# Patient Record
Sex: FEMALE | Race: WHITE | NOT HISPANIC OR LATINO | Employment: FULL TIME | URBAN - METROPOLITAN AREA
[De-identification: names, ages, dates, MRNs, and addresses within clinical notes are randomized per-mention and may not be internally consistent; named-entity substitution may affect disease eponyms.]

---

## 2020-03-22 ENCOUNTER — APPOINTMENT (OUTPATIENT)
Dept: RADIOLOGY | Facility: IMAGING CENTER | Age: 60
End: 2020-03-22
Attending: NURSE PRACTITIONER
Payer: OTHER MISCELLANEOUS

## 2020-03-22 ENCOUNTER — OCCUPATIONAL MEDICINE (OUTPATIENT)
Dept: URGENT CARE | Facility: PHYSICIAN GROUP | Age: 60
End: 2020-03-22
Payer: OTHER MISCELLANEOUS

## 2020-03-22 VITALS
HEART RATE: 72 BPM | RESPIRATION RATE: 16 BRPM | WEIGHT: 250 LBS | SYSTOLIC BLOOD PRESSURE: 138 MMHG | TEMPERATURE: 98.3 F | OXYGEN SATURATION: 95 % | BODY MASS INDEX: 39.24 KG/M2 | HEIGHT: 67 IN | DIASTOLIC BLOOD PRESSURE: 84 MMHG

## 2020-03-22 DIAGNOSIS — S61.208A OPEN WOUND OF LITTLE FINGER: ICD-10-CM

## 2020-03-22 DIAGNOSIS — Z23 NEED FOR TETANUS BOOSTER: ICD-10-CM

## 2020-03-22 DIAGNOSIS — S67.10XA CRUSH INJURY TO FINGER, INITIAL ENCOUNTER: ICD-10-CM

## 2020-03-22 PROCEDURE — 90471 IMMUNIZATION ADMIN: CPT | Mod: 29 | Performed by: NURSE PRACTITIONER

## 2020-03-22 PROCEDURE — 12001 RPR S/N/AX/GEN/TRNK 2.5CM/<: CPT | Mod: 29,F9 | Performed by: NURSE PRACTITIONER

## 2020-03-22 PROCEDURE — 73140 X-RAY EXAM OF FINGER(S): CPT | Mod: TC,RT | Performed by: NURSE PRACTITIONER

## 2020-03-22 PROCEDURE — 90715 TDAP VACCINE 7 YRS/> IM: CPT | Mod: 29 | Performed by: NURSE PRACTITIONER

## 2020-03-22 PROCEDURE — 99203 OFFICE O/P NEW LOW 30 MIN: CPT | Mod: 29,25 | Performed by: NURSE PRACTITIONER

## 2020-03-22 ASSESSMENT — ENCOUNTER SYMPTOMS
FEVER: 0
CHILLS: 0
BRUISES/BLEEDS EASILY: 0
TINGLING: 1
MYALGIAS: 1
SENSORY CHANGE: 1

## 2020-03-22 NOTE — LETTER
[facility]  [address] - [place] [address]  Phone:  [phone] - Fax:  [phone]   [facility] Progress Report and Disability Certification  Date of Service: 3/22/2020   No Show:  No  Date / Time of Next Visit: 3/29/2020 @ 1:00 PM    Claim Information   Patient Name: Daniela Dee  Claim Number:     Employer: [employer]  Date of Injury: 3/22/2020     Insurer / TPA:    ID / SSN:     Occupation:   Diagnosis: Diagnoses of Crush injury to finger, initial encounter, Open wound of little finger, and Need for tetanus booster were pertinent to this visit.    Medical Information   Related to Industrial Injury? Yes    Subjective Complaints:  DOI: 3/22/20. 59 year old female with crush injury to right 5th digit after getting caught between a  and a box at work this morning. She has open wound as well. She has mild pain and bleeding as well as some distal paresthesia. No weakness. She is left hand dominant. Unsure of last tetanus dosing. She has not done any treatment. No second job.   Objective Findings: Physical Exam  Constitutional:       Appearance: Normal appearance. She is not ill-appearing.   Musculoskeletal: Normal range of motion.      Right hand: She exhibits tenderness, bony tenderness, laceration and swelling. She exhibits normal range of motion.        Hands:    Skin:     General: Skin is warm and dry.      Comments: 1.5 cm open wound to palmar side of 5th digit right hand.   Neurological:      General: No focal deficit present.      Mental Status: She is alert.   Psychiatric:         Mood and Affect: Mood normal.         Behavior: Behavior normal.        Pre-Existing Condition(s):     Assessment:   Initial Visit    Status: Additional Care Required  Permanent Disability:No    Plan:      Diagnostics: X-ray    Comments:  Negative imaging    Disability Information   Status: Released to Full Duty    From:   3/22/2020  Through: 3/29/2020 Restrictions are: Temporary   Physical Restrictions   Sitting:    Standing:    Stooping:    Bending:      Squatting:    Walking:    Climbing:    Pushing:      Pulling:    Other:    Reaching Above Shoulder (L):   Reaching Above Shoulder (R):       Reaching Below Shoulder (L):    Reaching Below Shoulder (R):      Not to exceed Weight Limits   Carrying(hrs):   Weight Limit(lb):   Lifting(hrs):   Weight  Limit(lb):     Comments:      Repetitive Actions   Hands: i.e. Fine Manipulations from Grasping:     Feet: i.e. Operating Foot Controls:     Driving / Operate Machinery:     Physician Name: RADHA Scanlon Physician Signature: MADELINE Armijo e-Signature: Dr. Gurpreet Romero, Medical Director   Clinic Name / Location: [address]  [place] [address] Clinic Phone Number: Dept: [phone]   Appointment Time: 11:30 Am Visit Start Time: 11:43 AM   Check-In Time:  11:41 Am Visit Discharge Time:  12:21PM    Original-Treating Physician or Chiropractor    Page 2-Insurer/TPA    Page 3-Employer    Page 4-Employee

## 2020-03-22 NOTE — LETTER
[facility]  [address] - [place] [address]  Phone:  [phone] - Fax:  [phone]   [facility] Progress Report and Disability Certification  Date of Service: 3/22/2020   No Show:  No  Date / Time of Next Visit: 3/29/2020   Claim Information   Patient Name: Daniela Dee  Claim Number:     Employer: [employer]  Date of Injury: 3/22/2020     Insurer / TPA:    ID / SSN:     Occupation:     Diagnosis: Diagnoses of Crush injury to finger, initial encounter, Open wound of little finger, and Need for tetanus booster were pertinent to this visit.    Medical Information   Related to Industrial Injury? Yes    Subjective Complaints:  DOI: 3/22/20. 59 year old female with crush injury to right 5th digit after getting caught between a  and a box at work this morning. She has open wound as well. She has mild pain and bleeding as well as some distal paresthesia. No weakness. She is left hand dominant. Unsure of last tetanus dosing. She has not done any treatment. No second job.   Objective Findings: Physical Exam  Constitutional:       Appearance: Normal appearance. She is not ill-appearing.   Musculoskeletal: Normal range of motion.      Right hand: She exhibits tenderness, bony tenderness, laceration and swelling. She exhibits normal range of motion.        Hands:    Skin:     General: Skin is warm and dry.      Comments: 1.5 cm open wound to palmar side of 5th digit right hand.   Neurological:      General: No focal deficit present.      Mental Status: She is alert.   Psychiatric:         Mood and Affect: Mood normal.         Behavior: Behavior normal.        Pre-Existing Condition(s):     Assessment:   Initial Visit    Status: Additional Care Required  Permanent Disability:No    Plan:      Diagnostics: X-ray    Comments:  Negative imaging    Disability Information   Status: Released to Full Duty    From:   3/22/2020  Through: 3/29/2020 Restrictions are: Temporary   Physical Restrictions   Sitting:    Standing:    Stooping:    Bending:      Squatting:    Walking:    Climbing:    Pushing:      Pulling:    Other:    Reaching Above Shoulder (L):   Reaching Above Shoulder (R):       Reaching Below Shoulder (L):    Reaching Below Shoulder (R):      Not to exceed Weight Limits   Carrying(hrs):   Weight Limit(lb):   Lifting(hrs):   Weight  Limit(lb):     Comments:      Repetitive Actions   Hands: i.e. Fine Manipulations from Grasping:     Feet: i.e. Operating Foot Controls:     Driving / Operate Machinery:     Physician Name: RADHA Scanlon Physician Signature: MADELINE Armijo e-Signature: Dr. Gurpreet Romero, Medical Director   Clinic Name / Location: [address]  [place] [address] Clinic Phone Number: Dept: [phone]   Appointment Time: 11:30 Am Visit Start Time: 11:43 AM   Check-In Time:  11:41 Am Visit Discharge Time: 12:31 PM    Original-Treating Physician or Chiropractor    Page 2-Insurer/TPA    Page 3-Employer    Page 4-Employee

## 2020-03-22 NOTE — LETTER
"EMPLOYEE’S CLAIM FOR COMPENSATION/ REPORT OF INITIAL TREATMENT  FORM C-4    EMPLOYEE’S CLAIM - PROVIDE ALL INFORMATION REQUESTED   First Name  Daniela Last Name  Luiz Birthdate                    1960                Sex  female Claim Number   Home Address  [address] Age  59 y.o. Height  1.689 m (5' 6.5\") Weight  113.4 kg (250 lb) [facility]     [facility] [facility] Zip  [address] Telephone  [phone] (home) [phone] (work)   Mailing Address  [address] [facility] [place] [facility] Zip  [address] Primary Language Spoken  English    Insurer   Third Party       Employee's Occupation (Job Title) When Injury or Occupational Disease Occurred      Employer's Name   [employer]  Telephone      Employer Address  [address]  Zip  [address]    Date of Injury  3/22/2020               Hour of Injury  10:20 AM Date Employer Notified  3/22/2020 Last Day of Work after Injury or Occupational Disease  3/22/2020 Supervisor to Whom Injury Reported  Gianna Laureano    Address or Location of Accident (if applicable)  [[address] ]   What were you doing at the time of accident? (if applicable)  Tieing a bale of cardboard     How did this injury or occupational disease occur? (Be specific an answer in detail. Use additional sheet if necessary)  Bale was stuck to put my hand out to push it, it came back and smack my pinky finger in right hand to the bale   If you believe that you have an occupational disease, when did you first have knowledge of the disability and it relationship to your employment?  n/a Witnesses to the Accident  n/a      Nature of Injury or Occupational Disease  Laceration  Part(s) of Body Injured or Affected  Finger (R), ,     I certify that the above is true and correct to the best of my knowledge and that I have provided this information in order to " obtain the benefits of [place]  I hereby authorize any physician, chiropractor, surgeon, practitioner, or other person, any hospital, including [facility] or [facility] hospital, any medical service organization, any insurance company, or other institution or organization to release to each other, any medical or other information, including benefits paid or payable, pertinent to this injury or disease, except information relative to diagnosis, treatment and/or counseling for AIDS, psychological conditions, alcohol or controlled substances, for which I must give specific authorization.  A Photostat of this authorization shall be as valid as the original.     Date   Place   Employee’s Signature   THIS REPORT MUST BE COMPLETED AND MAILED WITHIN 3 WORKING DAYS OF TREATMENT   Place  [facility]  Name of Facility  [place]   Date  3/22/2020 Diagnosis  (S67.10XA) Crush injury to finger, initial encounter  (S61.208A) Open wound of little finger  (Z23) Need for tetanus booster Is there evidence the injured employee was under the influence of alcohol and/or another controlled substance at the time of accident?   Hour  11:43 AM Description of Injury or Disease  Diagnoses of Crush injury to finger, initial encounter, Open wound of little finger, and Need for tetanus booster were pertinent to this visit.     Treatment    Procedure: Laceration Repair  -Risks including bleeding, nerve damage, infection, and poor cosmetic outcome discussed. Benefits and alternatives discussed.   -Clean technique with sterile instruments and suture used  -Local anesthesia with 2% lidocaine  -Closed with2  #  4-0 Nylon interrupted sutures with good wound approximation  -Polysporin and dressing placed  -Patient tolerated well  tdap updated      Have you advised the patient to remain off work five days or more? No  "  X-Ray Findings  Negative   If Yes   From Date  To Date      From information given by the employee, together with medical evidence, can you directly connect this injury or occupational disease as job incurred?  Yes If No Full Duty Yes Modified Duty  No   Is additional medical care by a physician indicated?  Yes If Modified Duty, Specify any Limitations / Restrictions      Do you know of any previous injury or disease contributing to this condition or occupational disease?                            No   Date  3/22/2020 Print Doctor’s Name RADHA Scanlon I certify the employer’s copy of  this form was mailed on:   Address  [address] [place] [address]. [address] Insurer’s Use Only     [facility] Zip  [address]    Provider’s Tax ID Number  [id number] Telephone  Dept: [phone]        e-MADELINE Jack   e-Signature: Dr. Gurpreet Romero,   Medical Director Degree  MD        ORIGINAL-TREATING PHYSICIAN OR CHIROPRACTOR    PAGE 2-INSURER/TPA    PAGE 3-EMPLOYER    PAGE 4-EMPLOYEE             Form C-4 (rev.10/07)              BRIEF DESCRIPTION OF RIGHTS AND BENEFITS  (Pursuant to [place] 616[place])    Notice of Injury or Occupational Disease (Incident Report Form C-1): If an injury or occupational disease (OD) arises out of and in the course of employment, you must provide written notice to your employer as soon as practicable, but no later than 7 days after the accident or OD. Your employer shall maintain a sufficient supply of the required forms.    Claim for Compensation (Form C-4): If medical treatment is sought, the form C-4 is available at the place of initial treatment. A completed \"Claim for Compensation\" (Form C-4) must be filed within 90 days after an accident or OD. The treating physician or chiropractor must, within 3 working days after treatment, complete and mail to the employer, the employer's insurer and third-party , the Claim for Compensation.    Medical Treatment: If you " require medical treatment for your on-the-job injury or OD, you may be required to select a physician or chiropractor from a list provided by your workers’ compensation insurer, if it has contracted with an Organization for Managed Care (MCO) or Preferred Provider Organization (PPO) or providers of health care. If your employer has not entered into a contract with an MCO or PPO, you may select a physician or chiropractor from the Panel of Physicians and Chiropractors. Any medical costs related to your industrial injury or OD will be paid by your insurer.    Temporary Total Disability (TTD): If your doctor has certified that you are unable to work for a period of at least 5 consecutive days, or 5 cumulative days in a 20-day period, or places restrictions on you that your employer does not accommodate, you may be entitled to TTD compensation.    Temporary Partial Disability (TPD): If the wage you receive upon reemployment is less than the compensation for TTD to which you are entitled, the insurer may be required to pay you TPD compensation to make up the difference. TPD can only be paid for a maximum of 24 months.    Permanent Partial Disability (PPD): When your medical condition is stable and there is an indication of a PPD as a result of your injury or OD, within 30 days, your insurer must arrange for an evaluation by a rating physician or chiropractor to determine the degree of your PPD. The amount of your PPD award depends on the date of injury, the results of the PPD evaluation and your age and wage.    Permanent Total Disability (PTD): If you are medically certified by a treating physician or chiropractor as permanently and totally disabled and have been granted a PTD status by your insurer, you are entitled to receive monthly benefits not to exceed 66 2/3% of your average monthly wage. The amount of your PTD payments is subject to reduction if you previously received a PPD award.    Vocational Rehabilitation  Services: You may be eligible for vocational rehabilitation services if you are unable to return to the job due to a permanent physical impairment or permanent restrictions as a result of your injury or occupational disease.    Transportation and Per Silvia Reimbursement: You may be eligible for travel expenses and per silvia associated with medical treatment.    Reopening: You may be able to reopen your claim if your condition worsens after claim closure.    Appeal Process: If you disagree with a written determination issued by the insurer or the insurer does not respond to your request, you may appeal to the Department of Administration, , by following the instructions contained in your determination letter. You must appeal the determination within 70 days from the date of the determination letter at [address], [address], [place], [place] [address], or [address], [place], [place] [address]. If you disagree with the  decision, you may appeal to the Department of Administration, . You must file your appeal within 30 days from the date of the  decision letter at [address], [address], [place], [place] [address], or [address], [place], [place] [address]. If you disagree with a decision of an , you may file a petition for judicial review with the District Court. You must do so within 30 days of the Appeal Officer’s decision. You may be represented by an  at your own expense or you may contact the [facility] for possible representation.    [place]  for Injured Workers (NAIW): If you disagree with a  decision, you may request that NAIW represent you without charge at an  Hearing. For information regarding denial of benefits, you may contact the [facility] at: [address], [address], [place] [address], [phone], or [address],  [address], [place] [address], [phone]    To File a Complaint with the Division: If you wish to file a complaint with the  of the Division of Industrial Relations (DIR),  please contact the Workers’ Compensation Section, [address], [address], [place], [place] [address], telephone [phone]) [phone], or [address], [address], [place], [place] [address], telephone [phone].    For assistance with Workers’ Compensation Issues: You may contact the Office of the Governor Consumer Health Assistance, [address] [place] [address], [address], [place], [place] [address], Toll Free 1-638.997.4609, Web site: http://govcha.[facility].[place].us, E-mail [email or web address].                   __________________________________________________________________                                                     _________        Employee Name / Signature                                                                                                                                              Date                                                                                                                                                                                                     D-2 (rev. 06/18)

## 2020-03-22 NOTE — LETTER
"EMPLOYEE’S CLAIM FOR COMPENSATION/ REPORT OF INITIAL TREATMENT  FORM C-4    EMPLOYEE’S CLAIM - PROVIDE ALL INFORMATION REQUESTED   First Name  Daniela Last Name  Luiz Birthdate                    1960                Sex  female Claim Number   Home Address  [address] Age  59 y.o. Height  1.689 m (5' 6.5\") Weight  113.4 kg (250 lb) [employer] [facility]     [facility] Zip  [address] Telephone  [phone] (home) [phone] (work)   Mailing Address  [address] [facility] Zip  [address] Primary Language Spoken  English    Insurer   Third Party       Employee's Occupation (Job Title) When Injury or Occupational Disease Occurred      Employer's Name  [employer]  Telephone  [phone]    Employer Address  [address]  Zip  [address]   Date of Injury  3/22/2020               Hour of Injury  10:20 AM Date Employer Notified  3/22/2020 Last Day of Work after Injury or Occupational Disease  3/22/2020 Supervisor to Whom Injury Reported  Gianna Laureano    Address or Location of Accident (if applicable)  [[address] ]   What were you doing at the time of accident? (if applicable)  Tieing a bale of cardboard     How did this injury or occupational disease occur? (Be specific an answer in detail. Use additional sheet if necessary)  Bale was stuck to put my hand out to push it, it came back and smack my pinky finger in right hand to the bale   If you believe that you have an occupational disease, when did you first have knowledge of the disability and it relationship to your employment?  n/a Witnesses to the Accident  n/a      Nature of Injury or Occupational Disease  Laceration  Part(s) of Body Injured or Affected  Finger (R), ,     I certify that the above is true and correct to the best of my knowledge and that I have provided this information in order " to obtain the benefits of [place]  I hereby authorize any physician, chiropractor, surgeon, practitioner, or other person, any hospital, including [facility] or [facility], any medical service organization, any insurance company, or other institution or organization to release to each other, any medical or other information, including benefits paid or payable, pertinent to this injury or disease, except information relative to diagnosis, treatment and/or counseling for AIDS, psychological conditions, alcohol or controlled substances, for which I must give specific authorization.  A Photostat of this authorization shall be as valid as the original.     Date   Place   Employee’s Signature   THIS REPORT MUST BE COMPLETED AND MAILED WITHIN 3 WORKING DAYS OF TREATMENT   Place  [facility]  Name of Facility  [place]   Date  3/22/2020 Diagnosis  (S67.10XA) Crush injury to finger, initial encounter  (S61.208A) Open wound of little finger  (Z23) Need for tetanus booster Is there evidence the injured employee was under the influence of alcohol and/or another controlled substance at the time of accident?   Hour  11:43 AM Description of Injury or Disease  Diagnoses of Crush injury to finger, initial encounter, Open wound of little finger, and Need for tetanus booster were pertinent to this visit.     Treatment    Procedure: Laceration Repair  -Risks including bleeding, nerve damage, infection, and poor cosmetic outcome discussed. Benefits and alternatives discussed.   -Clean technique with sterile instruments and suture used  -Local anesthesia with 2% lidocaine  -Closed with2  #  4-0 Nylon interrupted sutures with good wound approximation  -Polysporin and dressing placed  -Patient tolerated well  tdap updated      Have you advised the patient to remain off work five days or more?  "No   X-Ray Findings  Negative   If Yes   From Date  To Date      From information given by the employee, together with medical evidence, can you directly connect this injury or occupational disease as job incurred?  Yes If No Full Duty Yes Modified Duty  No   Is additional medical care by a physician indicated?  Yes If Modified Duty, Specify any Limitations / Restrictions      Do you know of any previous injury or disease contributing to this condition or occupational disease?                            No   Date  3/22/2020 Print Doctor’s Name RADHA Scanlon I certify the employer’s copy of  this form was mailed on:   Address  [address] [place] [address]. [address] Insurer’s Use Only     [facility] Zip  [address]    Provider’s Tax ID Number  [id number] Telephone  Dept: [phone]        e-MADELINE Jack   e-Signature: Dr. Gurpreet Romero,   Medical Director Degree  MD        ORIGINAL-TREATING PHYSICIAN OR CHIROPRACTOR    PAGE 2-INSURER/TPA    PAGE 3-EMPLOYER    PAGE 4-EMPLOYEE             Form C-4 (rev.10/07)              BRIEF DESCRIPTION OF RIGHTS AND BENEFITS  (Pursuant to [place] 616C.[place])    Notice of Injury or Occupational Disease (Incident Report Form C-1): If an injury or occupational disease (OD) arises out of and in the course of employment, you must provide written notice to your employer as soon as practicable, but no later than 7 days after the accident or OD. Your employer shall maintain a sufficient supply of the required forms.    Claim for Compensation (Form C-4): If medical treatment is sought, the form C-4 is available at the place of initial treatment. A completed \"Claim for Compensation\" (Form C-4) must be filed within 90 days after an accident or OD. The treating physician or chiropractor must, within 3 working days after treatment, complete and mail to the employer, the employer's insurer and third-party , the Claim for Compensation.    Medical Treatment: If you " require medical treatment for your on-the-job injury or OD, you may be required to select a physician or chiropractor from a list provided by your workers’ compensation insurer, if it has contracted with an Organization for Managed Care (MCO) or Preferred Provider Organization (PPO) or providers of health care. If your employer has not entered into a contract with an MCO or PPO, you may select a physician or chiropractor from the Panel of Physicians and Chiropractors. Any medical costs related to your industrial injury or OD will be paid by your insurer.    Temporary Total Disability (TTD): If your doctor has certified that you are unable to work for a period of at least 5 consecutive days, or 5 cumulative days in a 20-day period, or places restrictions on you that your employer does not accommodate, you may be entitled to TTD compensation.    Temporary Partial Disability (TPD): If the wage you receive upon reemployment is less than the compensation for TTD to which you are entitled, the insurer may be required to pay you TPD compensation to make up the difference. TPD can only be paid for a maximum of 24 months.    Permanent Partial Disability (PPD): When your medical condition is stable and there is an indication of a PPD as a result of your injury or OD, within 30 days, your insurer must arrange for an evaluation by a rating physician or chiropractor to determine the degree of your PPD. The amount of your PPD award depends on the date of injury, the results of the PPD evaluation and your age and wage.    Permanent Total Disability (PTD): If you are medically certified by a treating physician or chiropractor as permanently and totally disabled and have been granted a PTD status by your insurer, you are entitled to receive monthly benefits not to exceed 66 2/3% of your average monthly wage. The amount of your PTD payments is subject to reduction if you previously received a PPD award.    Vocational Rehabilitation  Services: You may be eligible for vocational rehabilitation services if you are unable to return to the job due to a permanent physical impairment or permanent restrictions as a result of your injury or occupational disease.    Transportation and Per Silvia Reimbursement: You may be eligible for travel expenses and per silvia associated with medical treatment.    Reopening: You may be able to reopen your claim if your condition worsens after claim closure.    Appeal Process: If you disagree with a written determination issued by the insurer or the insurer does not respond to your request, you may appeal to the Department of Administration, , by following the instructions contained in your determination letter. You must appeal the determination within 70 days from the date of the determination letter at [address], [address], [place], [place] [address], or [address], [place], [place] [address]. If you disagree with the  decision, you may appeal to the Department of Administration, . You must file your appeal within 30 days from the date of the  decision letter at [address], [address], [place], [place] [address], or [address], [address], [place], [place] [address]. If you disagree with a decision of an , you may file a petition for judicial review with the District Court. You must do so within 30 days of the Appeal Officer’s decision. You may be represented by an  at your own expense or you may contact the [facility] for possible representation.    [place]  for Injured Workers (NAIW): If you disagree with a  decision, you may request that NAIW represent you without charge at an  Hearing. For information regarding denial of benefits, you may contact the [facility] at: [address], [address], [place] [address], [phone], or [address],  [address], [place] [address], [phone]    To File a Complaint with the Division: If you wish to file a complaint with the  of the Division of Industrial Relations (DIR),  please contact the Workers’ Compensation Section, [address], [address], [place], [place] [address], telephone [phone], or [address], [address], [place], [place] [address], telephone [phone].    For assistance with Workers’ Compensation Issues: You may contact the Office of the Governor Consumer Health Assistance, [address] [place] [address], [address], [place], [place] [address], Toll Free 1-562.793.1539, Web site: http://govcha.[facility].[place].us, E-mail [email or web address].                   __________________________________________________________________                                                     _________        Employee Name / Signature                                                                                                                                              Date                                                                                                                                                                                                     D-2 (rev. 06/18)

## 2020-03-22 NOTE — PROGRESS NOTES
Subjective:      Daniela Dee is a 59 y.o. female who presents with Laceration (smashed pinky between metal and chio )            HPI DOI: 3/22/20. 59 year old female with crush injury to right 5th digit after getting caught between a  and a box at work this morning. She has open wound as well. She has mild pain and bleeding as well as some distal paresthesia. No weakness. She is left hand dominant. Unsure of last tetanus dosing. She has not done any treatment. No second job.    Bee venom  Current Outpatient Medications on File Prior to Visit   Medication Sig Dispense Refill   • oxycodone-acetaminophen (PERCOCET) 7.5-325 MG per tablet Take 1 Tab by mouth every four hours as needed.       • aspirin (ASA) 325 MG TABS Take 325 mg by mouth every 6 hours as needed.       • docusate sodium (COLACE) 100 MG CAPS Take 100 mg by mouth 2 times a day.       • celecoxib (CELEBREX) 200 MG CAPS Take 200 mg by mouth 2 times a day.       • oxycodone CR (OXYCONTIN) 10 MG TB12 Take 10 mg by mouth every 12 hours.       • ferrous gluconate (FERGON) 324 (38 FE) MG TABS Take 324 mg by mouth every morning with breakfast.       • Multiple Vitamin (MULTIVITAMIN PO) Take 1 Tab by mouth every day.       • Naproxen Sodium (ALEVE) 220 MG CAPS Take 2 Caps by mouth 2 Times a Day.         No current facility-administered medications on file prior to visit.      Social History     Socioeconomic History   • Marital status:      Spouse name: Not on file   • Number of children: Not on file   • Years of education: Not on file   • Highest education level: Not on file   Occupational History   • Not on file   Social Needs   • Financial resource strain: Not on file   • Food insecurity     Worry: Not on file     Inability: Not on file   • Transportation needs     Medical: Not on file     Non-medical: Not on file   Tobacco Use   • Smoking status: Never Smoker   • Smokeless tobacco: Never Used   Substance and Sexual Activity   • Alcohol use:  "No     Alcohol/week: 0.0 oz   • Drug use: No   • Sexual activity: Not on file   Lifestyle   • Physical activity     Days per week: Not on file     Minutes per session: Not on file   • Stress: Not on file   Relationships   • Social connections     Talks on phone: Not on file     Gets together: Not on file     Attends Religion service: Not on file     Active member of club or organization: Not on file     Attends meetings of clubs or organizations: Not on file     Relationship status: Not on file   • Intimate partner violence     Fear of current or ex partner: Not on file     Emotionally abused: Not on file     Physically abused: Not on file     Forced sexual activity: Not on file   Other Topics Concern   • Not on file   Social History Narrative   • Not on file     Breast Cancer-related family history is not on file.        Review of Systems   Constitutional: Negative for chills and fever.   Musculoskeletal: Positive for myalgias.   Skin:        Open wound to right pinky finger.   Neurological: Positive for tingling and sensory change.   Endo/Heme/Allergies: Does not bruise/bleed easily.          Objective:     /84 (BP Location: Right arm, Patient Position: Sitting, BP Cuff Size: Adult long)   Pulse 72   Temp 36.8 °C (98.3 °F) (Tympanic)   Resp 16   Ht 1.689 m (5' 6.5\")   Wt 113.4 kg (250 lb)   LMP 06/01/2010   SpO2 95%   Breastfeeding No   BMI 39.75 kg/m²      Physical Exam  Constitutional:       Appearance: Normal appearance. She is not ill-appearing.   Musculoskeletal: Normal range of motion.      Right hand: She exhibits tenderness, bony tenderness, laceration and swelling. She exhibits normal range of motion.        Hands:    Skin:     General: Skin is warm and dry.      Comments: 1.5 cm open wound to palmar side of 5th digit right hand.   Neurological:      General: No focal deficit present.      Mental Status: She is alert.   Psychiatric:         Mood and Affect: Mood normal.         Behavior: " Behavior normal.            Procedure: Laceration Repair  -Risks including bleeding, nerve damage, infection, and poor cosmetic outcome discussed. Benefits and alternatives discussed.   -Clean technique with sterile instruments and suture used  -Local anesthesia with 2% lidocaine  -Closed with2  #  4-0 Nylon interrupted sutures with good wound approximation  -Polysporin and dressing placed  -Patient tolerated well           Assessment/Plan:       1. Crush injury to finger, initial encounter  DX-FINGER(S) 2+ RIGHT   2. Open wound of little finger     3. Need for tetanus booster  Tdap =>6yo IM     X-ray negative.   Closure as above. Wound instructions to patient.  RTC 7 days. Tetanus updated today in clinic.  Differential diagnosis, natural history, supportive care, and indications for immediate follow-up discussed at length.

## 2020-03-29 ENCOUNTER — OCCUPATIONAL MEDICINE (OUTPATIENT)
Dept: URGENT CARE | Facility: PHYSICIAN GROUP | Age: 60
End: 2020-03-29
Payer: OTHER MISCELLANEOUS

## 2020-03-29 VITALS
HEART RATE: 82 BPM | RESPIRATION RATE: 18 BRPM | HEIGHT: 67 IN | BODY MASS INDEX: 39.24 KG/M2 | WEIGHT: 250 LBS | TEMPERATURE: 97.5 F | SYSTOLIC BLOOD PRESSURE: 128 MMHG | DIASTOLIC BLOOD PRESSURE: 84 MMHG | OXYGEN SATURATION: 96 %

## 2020-03-29 DIAGNOSIS — S67.10XD CRUSH INJURY TO FINGER, SUBSEQUENT ENCOUNTER: Primary | ICD-10-CM

## 2020-03-29 DIAGNOSIS — Z48.02 ENCOUNTER FOR REMOVAL OF SUTURES: ICD-10-CM

## 2020-03-29 PROCEDURE — 99213 OFFICE O/P EST LOW 20 MIN: CPT | Performed by: PHYSICIAN ASSISTANT

## 2020-03-29 NOTE — LETTER
[facility]  [address] [place] [address]. [address] - [place] [address]  Phone:  [phone] - Fax:  [phone]   [facility] Progress Report and Disability Certification  Date of Service: 3/29/2020   No Show:  No  Date / Time of Next Visit:     Claim Information   Patient Name: Daniela Dee  Claim Number:     Employer:   [employer]  Date of Injury: 3/22/2020     Insurer / TPA: [employer]  ID / SSN:     Occupation:   Diagnosis: The primary encounter diagnosis was Crush injury to finger, subsequent encounter. A diagnosis of Encounter for removal of sutures was also pertinent to this visit.    Medical Information   Related to Industrial Injury?      Subjective Complaints:  DOI: 3/22/2020.  PT here for suture removal of sutures to right pinky finger secondary to crush injury that occurred at work. Pt states wound is healing well, one of the sutures has fallen out already.  ROM is back to normal.     Objective Findings: PT has FROM of right 5th finger, mild tenderness on dorsal aspect.  Laceration is well healed.  Distal neuro/vascular intact.  Cap refill < 2 sec.     Pre-Existing Condition(s):     Assessment:   Condition Improved    Status: Discharged /  MMI  Permanent Disability:No    Plan:      Diagnostics:      Comments:       Disability Information   Status:      From:     Through:   Restrictions are:     Physical Restrictions   Sitting:    Standing:    Stooping:    Bending:      Squatting:    Walking:    Climbing:    Pushing:      Pulling:    Other:    Reaching Above Shoulder (L):   Reaching Above Shoulder (R):       Reaching Below Shoulder (L):    Reaching Below Shoulder (R):      Not to exceed Weight Limits   Carrying(hrs):   Weight Limit(lb):   Lifting(hrs):   Weight  Limit(lb):     Comments: Continue excellent wound care.  Discharged MMI.     Repetitive Actions   Hands: i.e. Fine Manipulations from Grasping:     Feet: i.e.  Operating Foot Controls:     Driving / Operate Machinery:     Physician Name: James Gottlieb P.A.-C. Physician Signature: JAMES Kumar P.A.-C. e-Signature: Dr. Gurpreet Romero, Medical Director   Clinic Name / Location: [address]  [place] [address] Clinic Phone Number: Dept: [phone]   Appointment Time: 1:00 Pm Visit Start Time: 3:16 PM   Check-In Time:  2:54 Pm Visit Discharge Time:  4:01PM   Original-Treating Physician or Chiropractor    Page 2-Insurer/TPA    Page 3-Employer    Page 4-Employee

## 2020-03-29 NOTE — PROGRESS NOTES
"Subjective:      Daniela Dee is a 59 y.o. female who presents with Suture / Staple Removal (on pinky of Right hand )    Pt PMH, SocHx, SurgHx, FamHx, Drug allergies and medications reviewed with pt/EPIC.      Family history reviewed, it is not pertinent to this complaint.     DOI: 3/22/2020.  PT here for suture removal of sutures to right pinky finger secondary to crush injury that occurred at work. Pt states wound is healing well, one of the sutures has fallen out already.  ROM is back to normal.       Patient presents with: work comp follow up.  Suture / Staple Removal: on pinky of Right hand         Suture / Staple Removal   The sutures were placed 7 to 10 days ago. She tried regular soap and water washings since the wound repair. The treatment provided significant relief.       Review of Systems   All other systems reviewed and are negative.         Objective:     /84 (BP Location: Right arm, Patient Position: Sitting, BP Cuff Size: Adult)   Pulse 82   Temp 36.4 °C (97.5 °F) (Tympanic)   Resp 18   Ht 1.689 m (5' 6.5\")   Wt 113.4 kg (250 lb)   LMP 06/01/2010   SpO2 96%   Breastfeeding No   BMI 39.75 kg/m²      Physical Exam    PT has FROM of right 5th finger, mild tenderness on dorsal aspect.  Laceration is well healed.  Distal neuro/vascular intact.  Cap refill < 2 sec.      Suture removal:  Skin is well healed without erythema, edema, purulent drainage, redness or warmth.    Skin cleansed prior to suture removal.   1 sutures removed from well healed laceration.    Steri- strips applied.      Assessment/Plan:     1. Crush injury to finger, subsequent encounter     2. Encounter for removal of sutures       PT has reached MMI and has been discharged to return to work without restrictions.     "

## 2021-01-04 ENCOUNTER — OFFICE VISIT (OUTPATIENT)
Dept: URGENT CARE | Facility: PHYSICIAN GROUP | Age: 61
End: 2021-01-04
Payer: COMMERCIAL

## 2021-01-04 ENCOUNTER — TELEPHONE (OUTPATIENT)
Dept: URGENT CARE | Facility: PHYSICIAN GROUP | Age: 61
End: 2021-01-04

## 2021-01-04 ENCOUNTER — HOSPITAL ENCOUNTER (OUTPATIENT)
Dept: RADIOLOGY | Facility: MEDICAL CENTER | Age: 61
End: 2021-01-04
Attending: PHYSICIAN ASSISTANT
Payer: COMMERCIAL

## 2021-01-04 VITALS
HEART RATE: 82 BPM | WEIGHT: 250 LBS | RESPIRATION RATE: 16 BRPM | HEIGHT: 67 IN | TEMPERATURE: 98.1 F | SYSTOLIC BLOOD PRESSURE: 126 MMHG | BODY MASS INDEX: 39.24 KG/M2 | OXYGEN SATURATION: 98 % | DIASTOLIC BLOOD PRESSURE: 86 MMHG

## 2021-01-04 DIAGNOSIS — R22.0 RIGHT FACIAL SWELLING: ICD-10-CM

## 2021-01-04 DIAGNOSIS — K04.7 DENTAL ABSCESS: ICD-10-CM

## 2021-01-04 DIAGNOSIS — K04.7 DENTAL INFECTION: ICD-10-CM

## 2021-01-04 DIAGNOSIS — R51.9 FACIAL PAIN: ICD-10-CM

## 2021-01-04 PROCEDURE — 99214 OFFICE O/P EST MOD 30 MIN: CPT | Mod: 25 | Performed by: PHYSICIAN ASSISTANT

## 2021-01-04 PROCEDURE — 70486 CT MAXILLOFACIAL W/O DYE: CPT

## 2021-01-04 RX ORDER — AMOXICILLIN AND CLAVULANATE POTASSIUM 875; 125 MG/1; MG/1
1 TABLET, FILM COATED ORAL 2 TIMES DAILY
Qty: 14 TAB | Refills: 0 | Status: SHIPPED | OUTPATIENT
Start: 2021-01-04 | End: 2021-01-11

## 2021-01-04 ASSESSMENT — ENCOUNTER SYMPTOMS
SORE THROAT: 0
FEVER: 0
EYE DISCHARGE: 0
EDEMA: 1
SWOLLEN GLANDS: 0
CHILLS: 0
DIARRHEA: 0
COUGH: 0
FATIGUE: 0
VOMITING: 0
EYE REDNESS: 0
ABDOMINAL PAIN: 0

## 2021-01-04 NOTE — PROGRESS NOTES
"Subjective:      Daniela Dee is a 60 y.o. female who presents with Edema (Right side facial edema since this morning.)            Patient is a 60-year-old female who presents to urgent care with right-sided facial swelling, fullness and pain since this morning.  Patient denies any new medications, remote dental pain, ear pain or sore throat.  She reports that it sore from the upper cheek down to her jawline.  She denies any drainage from adjacent gum or gingival swelling.  She has not taken anything for her symptoms.  She denies any fevers, chills, shortness of breath or soreness to her throat.    Edema  This is a new problem. The current episode started today. The problem occurs constantly. The problem has been gradually worsening. Pertinent negatives include no abdominal pain, chills, congestion, coughing, fatigue, fever, rash, sore throat, swollen glands or vomiting. Exacerbated by: Pressure over her face. She has tried nothing for the symptoms.       Review of Systems   Constitutional: Negative for chills, fatigue, fever and malaise/fatigue.   HENT: Negative for congestion and sore throat.    Eyes: Negative for discharge and redness.   Respiratory: Negative for cough.    Gastrointestinal: Negative for abdominal pain, diarrhea and vomiting.   Skin: Negative for itching and rash.   All other systems reviewed and are negative.         Objective:     /86   Pulse 82   Temp 36.7 °C (98.1 °F) (Temporal)   Resp 16   Ht 1.689 m (5' 6.5\")   Wt 113.4 kg (250 lb)   LMP 06/01/2010   SpO2 98%   BMI 39.75 kg/m²      Physical Exam  Vitals signs reviewed.   Constitutional:       General: She is not in acute distress.     Appearance: She is well-developed.   HENT:      Head: Normocephalic and atraumatic.      Salivary Glands: Right salivary gland is not diffusely enlarged. Left salivary gland is not diffusely enlarged.        Comments: Diffuse edema to the right maxillary region, slight increased warmth with " faint erythema to lower jaw.  Scattered caries without discrete dental tenderness with percussion.  Without adjacent gingival erythema or discrete tooth abscess.  Without palpable stone along salivary glands.  Edema does not extend to right orbit.    Posterior oropharynx is clear without evidence of erythema or uvular deviation.     Mouth/Throat:      Pharynx: No oropharyngeal exudate.   Eyes:      Conjunctiva/sclera: Conjunctivae normal.      Pupils: Pupils are equal, round, and reactive to light.   Neck:      Musculoskeletal: Normal range of motion and neck supple.      Trachea: No tracheal deviation.   Cardiovascular:      Rate and Rhythm: Normal rate.      Heart sounds: No murmur.   Pulmonary:      Effort: Pulmonary effort is normal. No respiratory distress.      Breath sounds: Normal breath sounds.   Musculoskeletal: Normal range of motion.   Skin:     General: Skin is warm.      Findings: No rash.   Neurological:      Mental Status: She is alert and oriented to person, place, and time.      Coordination: Coordination normal.   Psychiatric:         Behavior: Behavior normal.         Thought Content: Thought content normal.         Judgment: Judgment normal.                 Assessment/Plan:        1. Right facial swelling  - cefTRIAXone (ROCEPHIN) 1 g, lidocaine (XYLOCAINE) 1 % 3.6 mL for IM use  - CT-MAXILLOFACIAL W/O PLUS RECONS; Future    2. Facial pain  - CT-MAXILLOFACIAL W/O PLUS RECONS; Future    Uncertain of etiology of facial edema.  Suspect facial cellulitis-although could be from dental etiology or salivary gland.  CT maxillofacial was ordered for further evaluation of this region.  Suspect infectious etiology will write for the above at this time.  No further systemic involvement or involvement of the orbital region or posterior oropharynx.  I will follow-up with this patient as results return.  Patient and/or guardian given precautionary s/sx that mandate immediate follow up and evaluation in the ED.  Advised of risks of not doing so.  Side effects of OTC or prescribed medications discussed.   DDX, Supportive care, and indications for immediate follow-up discussed with patient.    Instructed to return to clinic or nearest emergency department if we are not available for any change in condition, further concerns, or worsening of symptoms.    The patient and/or guardian demonstrated a good understanding and agreed with the treatment plan.    Please note that this dictation was created using voice recognition software. I have made every reasonable attempt to correct obvious errors, but I expect that there are errors of grammar and possibly content that I did not discover before finalizing the note.

## 2021-01-05 NOTE — TELEPHONE ENCOUNTER
Called and spoke with the patient regarding CT results-infection is localizing to a molar on that right aspect-we will start the patient on Augmentin of which she would like for me to send this into safely asking speech.  She is to start this ASAP and follow-up with dental.  Very strict ER precautions were further discussed.  Patient appreciated the call.

## 2021-03-15 DIAGNOSIS — Z23 NEED FOR VACCINATION: ICD-10-CM

## 2025-07-24 ENCOUNTER — OFFICE VISIT (OUTPATIENT)
Dept: MEDICAL GROUP | Facility: MEDICAL CENTER | Age: 65
End: 2025-07-24
Payer: COMMERCIAL

## 2025-07-24 VITALS
SYSTOLIC BLOOD PRESSURE: 144 MMHG | WEIGHT: 266.1 LBS | DIASTOLIC BLOOD PRESSURE: 92 MMHG | HEIGHT: 66 IN | BODY MASS INDEX: 42.77 KG/M2 | OXYGEN SATURATION: 96 % | TEMPERATURE: 97.9 F | HEART RATE: 92 BPM

## 2025-07-24 DIAGNOSIS — R06.83 SNORING: ICD-10-CM

## 2025-07-24 DIAGNOSIS — Z91.030 BEE ALLERGY STATUS: Primary | ICD-10-CM

## 2025-07-24 DIAGNOSIS — E66.01 MORBID OBESITY WITH BMI OF 40.0-44.9, ADULT (HCC): ICD-10-CM

## 2025-07-24 DIAGNOSIS — Z12.31 ENCOUNTER FOR SCREENING MAMMOGRAM FOR BREAST CANCER: ICD-10-CM

## 2025-07-24 DIAGNOSIS — M25.551 CHRONIC PAIN OF BOTH HIPS: ICD-10-CM

## 2025-07-24 DIAGNOSIS — M25.552 BILATERAL HIP PAIN: ICD-10-CM

## 2025-07-24 DIAGNOSIS — G89.29 CHRONIC PAIN OF BOTH HIPS: ICD-10-CM

## 2025-07-24 DIAGNOSIS — Z12.12 SCREENING FOR COLORECTAL CANCER: ICD-10-CM

## 2025-07-24 DIAGNOSIS — M25.551 BILATERAL HIP PAIN: ICD-10-CM

## 2025-07-24 DIAGNOSIS — Z23 NEED FOR VACCINATION: ICD-10-CM

## 2025-07-24 DIAGNOSIS — Z87.892 HISTORY OF ANAPHYLAXIS: ICD-10-CM

## 2025-07-24 DIAGNOSIS — I10 ESSENTIAL (PRIMARY) HYPERTENSION: ICD-10-CM

## 2025-07-24 DIAGNOSIS — Z12.11 SCREENING FOR COLORECTAL CANCER: ICD-10-CM

## 2025-07-24 DIAGNOSIS — Z91.030 BEE ALLERGY STATUS: ICD-10-CM

## 2025-07-24 DIAGNOSIS — Z87.892 HISTORY OF ANAPHYLAXIS: Primary | ICD-10-CM

## 2025-07-24 DIAGNOSIS — M53.3 SI (SACROILIAC) PAIN: ICD-10-CM

## 2025-07-24 DIAGNOSIS — M25.552 CHRONIC PAIN OF BOTH HIPS: ICD-10-CM

## 2025-07-24 RX ORDER — EPINEPHRINE 0.3 MG/.3ML
INJECTION SUBCUTANEOUS
Qty: 2 EACH | Refills: 0 | Status: SHIPPED | OUTPATIENT
Start: 2025-07-24

## 2025-07-24 RX ORDER — TELMISARTAN 40 MG/1
40 TABLET ORAL DAILY
Qty: 100 TABLET | Refills: 3 | Status: SHIPPED | OUTPATIENT
Start: 2025-07-24 | End: 2026-08-28

## 2025-07-24 RX ORDER — EPINEPHRINE 0.15 MG/.3ML
INJECTION INTRAMUSCULAR
Qty: 2 EACH | Refills: 1 | Status: SHIPPED | OUTPATIENT
Start: 2025-07-24 | End: 2025-07-24

## 2025-07-24 ASSESSMENT — ENCOUNTER SYMPTOMS
WEIGHT LOSS: 0
NAUSEA: 0
HEADACHES: 0
SHORTNESS OF BREATH: 0
PALPITATIONS: 0
DIZZINESS: 0
FEVER: 0
WHEEZING: 0
VOMITING: 0
CHILLS: 0

## 2025-07-24 ASSESSMENT — PATIENT HEALTH QUESTIONNAIRE - PHQ9: CLINICAL INTERPRETATION OF PHQ2 SCORE: 0

## 2025-07-24 NOTE — PROGRESS NOTES
"Subjective:     CC:  The primary encounter diagnosis was History of anaphylaxis. Diagnoses of Essential (primary) hypertension, Bee allergy status, Bilateral hip pain, Snoring, Screening for colorectal cancer, Morbid obesity with BMI of 40.0-44.9, adult ([facility]), Encounter for screening mammogram for breast cancer, SI (sacroiliac) pain, Chronic pain of both hips, and Need for vaccination were also pertinent to this visit.    HISTORY OF THE PRESENT ILLNESS: Patient is a 65 y.o. female. This pleasant patient is here today to establish care and discuss     She report she has not had a primary care for quite sometime  She does have history of TKR b/l  Overall she is feeling fine, but does have bilateral pain in the buttocks area.     Problem   Essential (Primary) Hypertension   Bilateral Hip Pain   Snoring   Morbid Obesity With Bmi of 40.0-44.9, Adult ([facility])       Health Maintenance:     ROS:   Review of Systems   Constitutional:  Negative for chills, fever and weight loss.   HENT:  Negative for hearing loss.    Respiratory:  Negative for shortness of breath and wheezing.    Cardiovascular:  Negative for chest pain and palpitations.   Gastrointestinal:  Negative for nausea and vomiting.   Genitourinary:  Negative for frequency and urgency.   Skin:  Negative for rash.   Neurological:  Negative for dizziness and headaches.         Objective:       Exam: BP (!) 144/92 (BP Location: Left arm, Patient Position: Sitting, BP Cuff Size: Large adult)   Pulse 92   Temp 36.6 °C (97.9 °F) (Temporal)   Ht 1.666 m (5' 5.59\")   Wt 121 kg (266 lb 1.5 oz)   SpO2 96%  Body mass index is 43.49 kg/m².    Physical Exam  Constitutional:       Appearance: Normal appearance.   Cardiovascular:      Rate and Rhythm: Normal rate and regular rhythm.      Heart sounds: No murmur heard.  Pulmonary:      Effort: Pulmonary effort is normal.      Breath sounds: Normal breath sounds. No wheezing.   Musculoskeletal:      Cervical back: Normal range of " motion and neck supple.   Lymphadenopathy:      Cervical: No cervical adenopathy.   Neurological:      Mental Status: She is alert.         Labs:     Assessment & Plan:   65 y.o. female with the following -    1. Essential (primary) hypertension  Chronic stable  Recommend patient obtain lab prior to starting telmisartan    - CBC WITHOUT DIFFERENTIAL; Future  - Lipid Profile; Future  - TSH WITH REFLEX TO FT4; Future  - HEMOGLOBIN A1C; Future  - Comp Metabolic Panel; Future  - MICROALBUMIN CREAT RATIO URINE; Future  - telmisartan (MICARDIS) 40 MG Tab; Take 1 Tablet by mouth every day.  Dispense: 100 Tablet; Refill: 3    2. History of anaphylaxis (Primary)    - EPINEPHrine (EPIPEN JR) 0.15 MG/0.3ML Solution Auto-injector injection; Inject 0.3 mL into the thigh one time for 1 dose  Dispense: 2 Each; Refill: 1    3. Bee allergy status    - EPINEPHrine (EPIPEN JR) 0.15 MG/0.3ML Solution Auto-injector injection; Inject 0.3 mL into the thigh one time for 1 dose  Dispense: 2 Each; Refill: 1    4. Bilateral hip pain  9. SI (sacroiliac) pain  Chronic stable   Bilateral hip/ buttock pain, with palpation of SI joint.   Plan  - lower back exercises provided for patient  - DX-SACROILIAC JOINTS 3+; Future  - Referral to Physical Therapy    5. Snoring  Stopbang 4  - Overnight Home Sleep Study; Future    6. Screening for colorectal cancer  Denies fhx  asymptomatic  - Cologuard® colon cancer screening    7. Morbid obesity with BMI of 40.0-44.9, adult ([facility])    - Patient identified as having weight management issue.  Appropriate orders and counseling given.    8. Encounter for screening mammogram for breast cancer    - MA-SCREENING MAMMO BILAT W/TOMOSYNTHESIS W/CAD; Future        10. Chronic pain of both hips  - Referral to Physical Therapy    11. Need for vaccination    - Pneumococcal Conjugate Vaccine 20-Valent (6 wks+)        Return in about 3 months (around 10/24/2025) for Lab review, Med check.    Please note that this dictation  was created using voice recognition software. I have made every reasonable attempt to correct obvious errors, but I expect that there are errors of grammar and possibly content that I did not discover before finalizing the note.

## 2025-07-28 NOTE — Clinical Note
REFERRAL APPROVAL NOTICE         Sent on July 28, 2025                   Daniela Dee  [address]  [place] [address]                   Dear Ms. Dee,    After a careful review of the medical information and benefit coverage, [facility] has processed your referral. See below for additional details.    If applicable, you must be actively enrolled with your insurance for coverage of the authorized service. If you have any questions regarding your coverage, please contact your insurance directly.    REFERRAL INFORMATION   Referral #:  [id number]  Referred-To Department    Referred-By Provider:  Pulmonary and Sleep Medicine    Erich Treviño M.D.   [facility]      [address]  [address]  [place] [address]  [phone] [address] [facility] [place]  [place] [id number] [phone]    Referral Start Date:  07/24/2025  Referral End Date:   07/24/2026             SCHEDULING  If you do not already have an appointment, please call [phone] to make an appointment.     MORE INFORMATION  If you do not already have a [employer] account, sign up at: [email or web address]  You can access your medical information, make appointments, see lab results, billing information, and more.  If you have questions regarding this referral, please contact  the [facility] Referrals department at:             [phone]. Monday - Friday 8:00AM - 5:00PM.     Sincerely,    [facility]

## 2025-07-30 NOTE — Clinical Note
REFERRAL APPROVAL NOTICE         Sent on July 30, 2025                   Daniela Dee  [address]  [place] [address]                   Dear Ms. eDe,    After a careful review of the medical information and benefit coverage, [facility] has processed your referral. See below for additional details.    If applicable, you must be actively enrolled with your insurance for coverage of the authorized service. If you have any questions regarding your coverage, please contact your insurance directly.    REFERRAL INFORMATION   Referral #:  [id number]  Referred-To Department    Referred-By Provider:  Physical Therapy    Erich Treviño M.D.   [facility] [address]      [address] [facility]  [address]  [facility] [place] [id number] [phone] [address]  [address]  [place] [id number] [phone]    Referral Start Date:  07/24/2025  Referral End Date:   07/24/2026             SCHEDULING  If you do not already have an appointment, please call [phone] to make an appointment.     MORE INFORMATION  If you do not already have a [employer] account, sign up at: [email or web address]  You can access your medical information, make appointments, see lab results, billing information, and more.  If you have questions regarding this referral, please contact  the [facility] Referrals department at:             [phone]. Monday - Friday 8:00AM - 5:00PM.     Sincerely,    [facility]

## 2025-08-04 ENCOUNTER — TELEPHONE (OUTPATIENT)
Dept: HEALTH INFORMATION MANAGEMENT | Facility: OTHER | Age: 65
End: 2025-08-04
Payer: COMMERCIAL

## 2025-08-21 ENCOUNTER — TELEPHONE (OUTPATIENT)
Dept: MEDICAL GROUP | Facility: MEDICAL CENTER | Age: 65
End: 2025-08-21
Payer: COMMERCIAL

## 2025-08-27 LAB — NONINV COLON CA DNA+OCC BLD SCRN STL QL: NEGATIVE

## 2025-09-09 ENCOUNTER — APPOINTMENT (OUTPATIENT)
Dept: RADIOLOGY | Facility: MEDICAL CENTER | Age: 65
End: 2025-09-09
Attending: STUDENT IN AN ORGANIZED HEALTH CARE EDUCATION/TRAINING PROGRAM
Payer: COMMERCIAL